# Patient Record
Sex: MALE | Race: WHITE | NOT HISPANIC OR LATINO | URBAN - METROPOLITAN AREA
[De-identification: names, ages, dates, MRNs, and addresses within clinical notes are randomized per-mention and may not be internally consistent; named-entity substitution may affect disease eponyms.]

---

## 2019-05-18 ENCOUNTER — OUTPATIENT (OUTPATIENT)
Dept: OUTPATIENT SERVICES | Facility: HOSPITAL | Age: 19
LOS: 1 days | End: 2019-05-18

## 2019-05-18 VITALS
HEART RATE: 91 BPM | WEIGHT: 162.04 LBS | DIASTOLIC BLOOD PRESSURE: 65 MMHG | RESPIRATION RATE: 16 BRPM | HEIGHT: 71 IN | TEMPERATURE: 98 F | SYSTOLIC BLOOD PRESSURE: 127 MMHG

## 2019-05-18 DIAGNOSIS — N43.3 HYDROCELE, UNSPECIFIED: ICD-10-CM

## 2019-05-18 LAB
HCT VFR BLD CALC: 46.1 % — SIGNIFICANT CHANGE UP (ref 39–50)
HGB BLD-MCNC: 15.1 G/DL — SIGNIFICANT CHANGE UP (ref 13–17)
MCHC RBC-ENTMCNC: 28.8 PG — SIGNIFICANT CHANGE UP (ref 27–34)
MCHC RBC-ENTMCNC: 32.8 % — SIGNIFICANT CHANGE UP (ref 32–36)
MCV RBC AUTO: 87.8 FL — SIGNIFICANT CHANGE UP (ref 80–100)
NRBC # FLD: 0 K/UL — SIGNIFICANT CHANGE UP (ref 0–0)
PLATELET # BLD AUTO: 282 K/UL — SIGNIFICANT CHANGE UP (ref 150–400)
PMV BLD: 9.8 FL — SIGNIFICANT CHANGE UP (ref 7–13)
RBC # BLD: 5.25 M/UL — SIGNIFICANT CHANGE UP (ref 4.2–5.8)
RBC # FLD: 13 % — SIGNIFICANT CHANGE UP (ref 10.3–14.5)
WBC # BLD: 8.68 K/UL — SIGNIFICANT CHANGE UP (ref 3.8–10.5)
WBC # FLD AUTO: 8.68 K/UL — SIGNIFICANT CHANGE UP (ref 3.8–10.5)

## 2019-05-18 NOTE — H&P PST ADULT - NEGATIVE NEUROLOGICAL SYMPTOMS
no focal seizures/no weakness/no paresthesias/no transient paralysis/no generalized seizures/no syncope

## 2019-05-18 NOTE — H&P PST ADULT - HISTORY OF PRESENT ILLNESS
Pt is a Pt is a 18 yr old male scheduled for Right Hydrocelectomy with Dr Gitlin 6/4/19 - pt c/o of hydrocele noted 3 years ago and now has increased slightly in size. Pt denies pain.

## 2019-05-18 NOTE — H&P PST ADULT - NSICDXPROBLEM_GEN_ALL_CORE_FT
PROBLEM DIAGNOSES  Problem: Hydrocele  Assessment and Plan: Pt scheduled for surgery and verbalized understanding of preop instructions including for Famotidine

## 2019-06-03 ENCOUNTER — TRANSCRIPTION ENCOUNTER (OUTPATIENT)
Age: 19
End: 2019-06-03

## 2019-06-04 ENCOUNTER — OUTPATIENT (OUTPATIENT)
Dept: OUTPATIENT SERVICES | Facility: HOSPITAL | Age: 19
LOS: 1 days | Discharge: HOME | End: 2019-06-04

## 2019-06-04 VITALS
RESPIRATION RATE: 16 BRPM | TEMPERATURE: 98 F | WEIGHT: 162.04 LBS | HEART RATE: 91 BPM | SYSTOLIC BLOOD PRESSURE: 127 MMHG | HEIGHT: 71 IN | DIASTOLIC BLOOD PRESSURE: 65 MMHG

## 2019-06-04 VITALS
DIASTOLIC BLOOD PRESSURE: 69 MMHG | RESPIRATION RATE: 15 BRPM | OXYGEN SATURATION: 100 % | SYSTOLIC BLOOD PRESSURE: 119 MMHG | HEART RATE: 74 BPM

## 2019-06-04 DIAGNOSIS — N43.3 HYDROCELE, UNSPECIFIED: ICD-10-CM

## 2019-06-04 RX ORDER — ACETAMINOPHEN 500 MG
2 TABLET ORAL
Qty: 0 | Refills: 0 | DISCHARGE

## 2019-06-04 RX ORDER — IBUPROFEN 200 MG
1 TABLET ORAL
Qty: 0 | Refills: 0 | DISCHARGE

## 2019-06-04 RX ORDER — OXYCODONE HYDROCHLORIDE 5 MG/1
1 TABLET ORAL
Qty: 5 | Refills: 0
Start: 2019-06-04

## 2019-06-04 RX ORDER — CEPHALEXIN 500 MG
1 CAPSULE ORAL
Qty: 12 | Refills: 0
Start: 2019-06-04 | End: 2019-06-07

## 2019-06-04 RX ORDER — DEXTROMETHORPHAN POLISTIREX 30 MG/5 ML
5 SUSPENSION, EXTENDED RELEASE 12 HR ORAL
Qty: 0 | Refills: 0 | DISCHARGE

## 2019-06-04 NOTE — ASU DISCHARGE PLAN (ADULT/PEDIATRIC) - ASU DC SPECIAL INSTRUCTIONSFT
1. Call for appointment for drain removal this Friday.  2. Keep scrotal support for 2 days.  3. May shower in 2 days.  4. Motrin and tylenol alternating as needed for pain. Take narcotic medication as needed for pain control.  5. No showering/bathing for 48 hours.   6. Do not remove dressing, allow dressing to fall off on own.  7. Complete prescribed antbiotics. 1. Call for appointment for drain removal this Friday.  2. Keep scrotal support for 2 days.  3. May shower in 2 days.  4. Motrin and Tylenol alternating as needed for pain. Take narcotic medication as needed for pain control.  5. No showering/bathing for 48 hours.   6. Do not remove dressing, allow dressing to fall off on own.  7. Complete prescribed anabiotics.

## 2019-06-04 NOTE — ASU DISCHARGE PLAN (ADULT/PEDIATRIC) - CARE PROVIDER_API CALL
Gitlin, Jordan S (MD)  Pediatric Urology; Urology  [address], [address]  [place] [id number]  Phone: [phone]  Fax: [phone]  Follow Up Time:

## 2022-12-02 PROBLEM — N43.3 HYDROCELE, UNSPECIFIED: Chronic | Status: ACTIVE | Noted: 2019-05-18

## 2022-12-14 ENCOUNTER — APPOINTMENT (OUTPATIENT)
Dept: ALLERGY | Facility: CLINIC | Age: 22
End: 2022-12-14

## 2023-01-05 ENCOUNTER — APPOINTMENT (OUTPATIENT)
Dept: ALLERGY | Facility: CLINIC | Age: 23
End: 2023-01-05
Payer: COMMERCIAL

## 2023-01-05 ENCOUNTER — NON-APPOINTMENT (OUTPATIENT)
Age: 23
End: 2023-01-05

## 2023-01-05 VITALS
BODY MASS INDEX: 19.37 KG/M2 | WEIGHT: 143 LBS | DIASTOLIC BLOOD PRESSURE: 60 MMHG | SYSTOLIC BLOOD PRESSURE: 108 MMHG | HEART RATE: 99 BPM | TEMPERATURE: 98.9 F | HEIGHT: 72 IN | OXYGEN SATURATION: 99 %

## 2023-01-05 PROBLEM — Z00.00 ENCOUNTER FOR PREVENTIVE HEALTH EXAMINATION: Status: ACTIVE | Noted: 2023-01-05

## 2023-01-05 PROCEDURE — 95004 PERQ TESTS W/ALRGNC XTRCS: CPT

## 2023-01-05 PROCEDURE — 99204 OFFICE O/P NEW MOD 45 MIN: CPT | Mod: 25

## 2023-01-05 PROCEDURE — 95018 ALL TSTG PERQ&IQ DRUGS/BIOL: CPT

## 2023-01-05 NOTE — HISTORY OF PRESENT ILLNESS
[de-identified] : Patient has been followed by GI for abdominal pain - diarrhea - ongoing for years - some nausea and constipation - mother with celiac - endoscopy negative for celiac - allergy blood testing positive to many allergens.   The endoscopy revealed inflammation of the stomach and EOE was mentioned as a possibility    Treated with omeprazole with some improvement in his symptoms.    Lessened his reflux symptoms.  \par \par Patient has not reported any acute onset of allergic symptoms after ingestion.   Recent ate scrambled eggs and transient itch on lips - resolved \par \par No history of asthma - eczema - he does have seasonal allergies and with cat exposure.  \par \par Mild oral itch with almond ingestion

## 2023-01-05 NOTE — PHYSICAL EXAM
[Alert] : alert [No Acute Distress] : no acute distress [No Neck Mass] : no neck mass was observed [No LAD] : no lymphadenopathy [Normal Rate and Effort] : normal respiratory rhythm and effort [No Crackles] : no crackles [No Retractions] : no retractions [Bilateral Audible Breath Sounds] : bilateral audible breath sounds [Normal Rate] : heart rate was normal  [Normal S1, S2] : normal S1 and S2 [No murmur] : no murmur [Regular Rhythm] : with a regular rhythm [Normal Cervical Lymph Nodes] : cervical [Skin Intact] : skin intact  [No Rash] : no rash [No Cyanosis] : no cyanosis [Normal Mood] : mood was normal [Normal Affect] : affect was normal [Judgment and Insight Age Appropriate] : judgement and insight is age appropriate [Alert, Awake, Oriented as Age-Appropriate] : alert, awake, oriented as age appropriate [Wheezing] : no wheezing was heard [de-identified] : thin male

## 2023-01-05 NOTE — ASSESSMENT
[FreeTextEntry1] : Abdominal pain - diarrhea - constipation:\par \par Many foods are minimally positive on ImmunoCAP but negative on skin testing - the higher values to hazelnut - almond and peanut are secondary to cross reacting allergens between these foods and spring time pollen but patient tolerates ingestion of hazelnut and peanut with no reaction and minimal oral itch with almond.   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

## 2023-01-05 NOTE — SOCIAL HISTORY
[House] : [unfilled] lives in a house  [Dog] : dog [Single] : single [FreeTextEntry1] : Lives with parents and 2 siblings\par 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

## 2023-01-05 NOTE — DATA REVIEWED
[FreeTextEntry1] : Low level of positive ImmunoCAP results which do not correlate with symptoms \par Hazelnut - almond - peanut - positive most likely secondary to spring time pollen allergies